# Patient Record
Sex: FEMALE | Race: WHITE | NOT HISPANIC OR LATINO
[De-identification: names, ages, dates, MRNs, and addresses within clinical notes are randomized per-mention and may not be internally consistent; named-entity substitution may affect disease eponyms.]

---

## 2019-04-04 ENCOUNTER — TRANSCRIPTION ENCOUNTER (OUTPATIENT)
Age: 54
End: 2019-04-04

## 2022-04-05 PROBLEM — Z00.00 ENCOUNTER FOR PREVENTIVE HEALTH EXAMINATION: Status: ACTIVE | Noted: 2022-04-05

## 2022-05-10 ENCOUNTER — APPOINTMENT (OUTPATIENT)
Dept: BARIATRICS/WEIGHT MGMT | Facility: CLINIC | Age: 57
End: 2022-05-10

## 2022-08-25 ENCOUNTER — APPOINTMENT (OUTPATIENT)
Dept: BARIATRICS/WEIGHT MGMT | Facility: CLINIC | Age: 57
End: 2022-08-25

## 2022-08-25 VITALS — HEIGHT: 64 IN | BODY MASS INDEX: 30.39 KG/M2 | WEIGHT: 178 LBS

## 2022-08-25 DIAGNOSIS — Z86.39 PERSONAL HISTORY OF OTHER ENDOCRINE, NUTRITIONAL AND METABOLIC DISEASE: ICD-10-CM

## 2022-08-25 DIAGNOSIS — Z83.438 FAMILY HISTORY OF OTHER DISORDER OF LIPOPROTEIN METABOLISM AND OTHER LIPIDEMIA: ICD-10-CM

## 2022-08-25 PROCEDURE — 99205 OFFICE O/P NEW HI 60 MIN: CPT

## 2022-08-25 NOTE — HISTORY OF PRESENT ILLNESS
[FreeTextEntry1] : 56 y/o F here for initial medical weight management consultation, self referred \par Medical Hx: Obesity, HLD \par Started struggling with weight after having a hysterectomy in 2010 for hx of fibroids and heavy bleeding. \par Past Wt Loss Attempts: WW, low carb, Atkins, Intermittent fasting, Medi weight loss \par Medications- tried phentermine in the past which was helpful with cravings & appetite\par Highest Adult Wt: now, Lowest Adult Wt:140, Goal:145 lb\par Food Recall: B- eggs, ham, eng muffin, L- salad or soup, D- steak, vegetables, baked potato\par Snacks- pretzels, nuts\par Water Intake: inadequate\par Exercise Regimen: Walks 3-4 miles x 6 days per week, in addition does strength training\par Sleep: adequate \par Stress Level: low stress\par Gyn: Postmenopausal\par Social Hx: + alcohol intake (4 nights per week, drinks beer)\par Labs: done at [facility] weight loss center 6 months ago, will send for my review \par \par \par

## 2022-08-25 NOTE — ASSESSMENT
[FreeTextEntry1] : Dietary Modification Education provided. Recommend a diet high in vegetables intake & lean protein. Recommend eating complex carbs instead of simple carbs. Discussed the healthy plate model and ways to help with portion control. Recommend Tracking dietary intake daily to increase your awareness of dietary intake- will start tracking with WW ria\par Physical Activity- continue current exercise routine\par Labs- to send results for my review \par Medication- pt meets criteria to initiate medication treatment of obesity. Instructed pt to reach out to insurance company to see if saxenda or wegovy are covered.\par Discussed the option to treat obesity with Saxenda. Patient denies contraindications to taking saxenda: denies family hx or personal history of medullary thyroid carcinoma or MEN 2, denies history of pancreatitis/gallbladder disease/hypoglycemia/renal impairment/ suicidal ideation.  Discussed mechanism of action- works like GLP-1 by regulating appetite/ reducing hunger. Reviewed side effects including: N/V/D/C, injection site reactions, headaches, low blood sugar, dizziness, abdominal pain, and fatigue.  Discussed injection education & titration schedule.\par RTO in 2-4 weeks for next steps \par \par

## 2022-09-14 ENCOUNTER — APPOINTMENT (OUTPATIENT)
Dept: BARIATRICS/WEIGHT MGMT | Facility: CLINIC | Age: 57
End: 2022-09-14

## 2022-09-14 VITALS — BODY MASS INDEX: 29.88 KG/M2 | WEIGHT: 175 LBS | HEIGHT: 64 IN

## 2022-09-14 LAB
ALBUMIN SERPL ELPH-MCNC: 4.7 G/DL
ALP BLD-CCNC: 74 U/L
ALT SERPL-CCNC: 32 U/L
ANION GAP SERPL CALC-SCNC: 11 MMOL/L
AST SERPL-CCNC: 23 U/L
BASOPHILS # BLD AUTO: 0.02 K/UL
BASOPHILS NFR BLD AUTO: 0.3 %
BILIRUB SERPL-MCNC: 0.3 MG/DL
BUN SERPL-MCNC: 12 MG/DL
CALCIUM SERPL-MCNC: 9.5 MG/DL
CHLORIDE SERPL-SCNC: 102 MMOL/L
CHOLEST SERPL-MCNC: 289 MG/DL
CO2 SERPL-SCNC: 27 MMOL/L
CREAT SERPL-MCNC: 0.81 MG/DL
CRP SERPL-MCNC: 12 MG/L
EGFR: 86 ML/MIN/1.73M2
EOSINOPHIL # BLD AUTO: 0.22 K/UL
EOSINOPHIL NFR BLD AUTO: 3.5 %
ESTIMATED AVERAGE GLUCOSE: 103 MG/DL
GLUCOSE SERPL-MCNC: 101 MG/DL
HBA1C MFR BLD HPLC: 5.2 %
HCT VFR BLD CALC: 45.6 %
HDLC SERPL-MCNC: 68 MG/DL
HGB BLD-MCNC: 14.4 G/DL
IMM GRANULOCYTES NFR BLD AUTO: 0.6 %
INSULIN P FAST SERPL-ACNC: 8 UU/ML
LDLC SERPL CALC-MCNC: 196 MG/DL
LYMPHOCYTES # BLD AUTO: 1.4 K/UL
LYMPHOCYTES NFR BLD AUTO: 22.2 %
MAN DIFF?: NORMAL
MCHC RBC-ENTMCNC: 29.6 PG
MCHC RBC-ENTMCNC: 31.6 GM/DL
MCV RBC AUTO: 93.8 FL
MONOCYTES # BLD AUTO: 0.53 K/UL
MONOCYTES NFR BLD AUTO: 8.4 %
NEUTROPHILS # BLD AUTO: 4.09 K/UL
NEUTROPHILS NFR BLD AUTO: 65 %
NONHDLC SERPL-MCNC: 221 MG/DL
PLATELET # BLD AUTO: 208 K/UL
POTASSIUM SERPL-SCNC: 4.8 MMOL/L
PROT SERPL-MCNC: 7.3 G/DL
RBC # BLD: 4.86 M/UL
RBC # FLD: 12.3 %
SODIUM SERPL-SCNC: 141 MMOL/L
T3 SERPL-MCNC: 105 NG/DL
T4 FREE SERPL-MCNC: 1 NG/DL
TRIGL SERPL-MCNC: 121 MG/DL
TSH SERPL-ACNC: 4.18 UIU/ML
WBC # FLD AUTO: 6.3 K/UL

## 2022-09-14 PROCEDURE — 99213 OFFICE O/P EST LOW 20 MIN: CPT | Mod: 95

## 2022-09-14 NOTE — ASSESSMENT
[FreeTextEntry1] : Recommend dietary modification. Reduce intake of bad fat including-fried foods, full fat dairy products, red meat, meat with skin on it, processed fats/ snacks. Recommend eating foods with healthy fat including avocado, fish, omega-3 fatty acids, nuts, lean protein. Recommend avoiding simple sugars and instead eating complex carbohydrates/ whole grains. Recommend increasing your physical activity. Avoid alcohol consumption.\par Continue current exercise routine of walking 4 + days per week, inc strength training when able\par Medication- can continue to take phentermine. In addition, will send rx for saxenda to help with weight loss which should help reduce cholesterol levels. \par Labs- recheck CMP, CRP and cholesterol in 3 months, if not improved should discuss starting a statin with PCP \par RTO in 1 month- 10/12 at 3:45 \par \par

## 2022-09-14 NOTE — HISTORY OF PRESENT ILLNESS
[Other Location: e.g. School (Enter Location, City,State)___] : at [unfilled], at the time of the visit. [Other Location: e.g. Home (Enter Location, City,State)___] : at [unfilled] [Verbal consent obtained from patient] : the patient, [unfilled] [FreeTextEntry1] : 58 y/o F here for initial medical weight management consultation, self referred \par Medical Hx: Obesity, HLD \par Started struggling with weight after having a hysterectomy in 2010 for hx of fibroids and heavy bleeding. \par Past Wt Loss Attempts: WW, low carb, Atkins, Intermittent fasting, Medi weight loss \par Medications- tried phentermine in the past which was helpful with cravings & appetite\par Highest Adult Wt: now, Lowest Adult Wt:140, Goal:145 lb\par Food Recall: B- eggs, ham, eng muffin, L- salad or soup, D- steak, vegetables, baked potato\par Snacks- pretzels, nuts\par Water Intake: inadequate\par Exercise Regimen: Walks 3-4 miles x 6 days per week, in addition does strength training\par Sleep: adequate \par Stress Level: low stress\par Gyn: Postmenopausal\par Social Hx: + alcohol intake (4 nights per week, drinks beer)\par Labs: done at [facility] weight loss center 6 months ago, will send for my review \par \par 9/14/22: Lost 3 lbs, has been focusing on increasing water intake and eating better overall. Walks 4-6 times per week. Reduced alcohol intake to 2 x per week. Takes phentermine 30 mg daily and feels that this helps with decreased cravings.

## 2022-09-28 ENCOUNTER — TRANSCRIPTION ENCOUNTER (OUTPATIENT)
Age: 57
End: 2022-09-28

## 2022-10-19 ENCOUNTER — APPOINTMENT (OUTPATIENT)
Dept: BARIATRICS/WEIGHT MGMT | Facility: CLINIC | Age: 57
End: 2022-10-19

## 2022-10-19 VITALS — BODY MASS INDEX: 29.19 KG/M2 | WEIGHT: 171 LBS | HEIGHT: 64 IN

## 2022-10-19 PROCEDURE — 99213 OFFICE O/P EST LOW 20 MIN: CPT | Mod: 95

## 2022-10-19 NOTE — HISTORY OF PRESENT ILLNESS
[FreeTextEntry1] : 56 y/o F here for initial medical weight management consultation, self referred \par Medical Hx: Obesity, HLD \par Started struggling with weight after having a hysterectomy in 2010 for hx of fibroids and heavy bleeding. \par Past Wt Loss Attempts: WW, low carb, Atkins, Intermittent fasting, Medi weight loss \par Medications- tried phentermine in the past which was helpful with cravings & appetite\par Highest Adult Wt: now, Lowest Adult Wt:140, Goal:145 lb\par Food Recall: B- eggs, ham, eng muffin, L- salad or soup, D- steak, vegetables, baked potato\par Snacks- pretzels, nuts\par Water Intake: inadequate\par Exercise Regimen: Walks 3-4 miles x 6 days per week, in addition does strength training\par Sleep: adequate \par Stress Level: low stress\par Gyn: Postmenopausal\par Social Hx: + alcohol intake (4 nights per week, drinks beer), works as a teacher \par Labs: done at [facility] weight loss center 6 months ago, will send for my review \par \par 9/14/22: Lost 3 lbs, has been focusing on increasing water intake and eating better overall. Walks 4-6 times per week. Reduced alcohol intake to 2 x per week. Takes phentermine 30 mg daily and feels that this helps with decreased cravings. \par \par 10/19/22: Lost 4 lbs, down 7 lbs total. Focusing on eating healthy, Food recall: B- overnight oats or eggs with turkey gee, L- salad or soup, D- protein with vegetables. Tolerating phentermine 30 mg daily, continues to report good appetite suppression & dec cravings. + Side effects of difficulty falling asleep, may try sleepy time tea. Tried taking omega 3 supplement but had bad gas so discontinued.

## 2022-10-19 NOTE — ASSESSMENT
[FreeTextEntry1] : Continue healthier dietary intake daily, try to focus on increasing daily water intake\par Continue current exercise routine of walking 10,000 steps per day throughout the week. Recommend she exercise on the weekend since she is busy during the school week. \par Medication- can continue to take phentermine. Sent Rx for saxenda, awaiting insurance authorization\par Issues with falling asleep- rec she try sleepy time tea and could try 1 mg of melatonin at night to see if it helps with falling asleep \par Labs- recheck CMP, CRP and cholesterol in 2 months, if not improved should discuss starting a statin\par RTO in 1 month

## 2022-11-14 ENCOUNTER — APPOINTMENT (OUTPATIENT)
Dept: BARIATRICS/WEIGHT MGMT | Facility: CLINIC | Age: 57
End: 2022-11-14

## 2022-11-14 PROCEDURE — 99212 OFFICE O/P EST SF 10 MIN: CPT | Mod: 95

## 2022-11-14 NOTE — HISTORY OF PRESENT ILLNESS
[Other Location: e.g. School (Enter Location, City,State)___] : at [unfilled], at the time of the visit. [Other Location: e.g. Home (Enter Location, City,State)___] : at [unfilled] [Verbal consent obtained from patient] : the patient, [unfilled] [FreeTextEntry1] : 56 y/o F here for initial medical weight management consultation, self referred \par Medical Hx: Obesity, HLD \par Started struggling with weight after having a hysterectomy in 2010 for hx of fibroids and heavy bleeding. \par Past Wt Loss Attempts: WW, low carb, Atkins, Intermittent fasting, Medi weight loss \par Medications- tried phentermine in the past which was helpful with cravings & appetite\par Highest Adult Wt: now, Lowest Adult Wt:140, Goal:145 lb\par Food Recall: B- eggs, ham, eng muffin, L- salad or soup, D- steak, vegetables, baked potato\par Snacks- pretzels, nuts\par Water Intake: inadequate\par Exercise Regimen: Walks 3-4 miles x 6 days per week, in addition does strength training\par Sleep: adequate \par Stress Level: low stress\par Gyn: Postmenopausal\par Social Hx: + alcohol intake (4 nights per week, drinks beer)\par Labs: done at [facility] weight loss center 6 months ago, will send for my review \par \par 9/14/22: Lost 3 lbs, has been focusing on increasing water intake and eating better overall. Walks 4-6 times per week. Reduced alcohol intake to 2 x per week. Takes phentermine 30 mg daily and feels that this helps with decreased cravings. \par \par 11/14/22: Maintained weight. Taking fish oil, which she is tolerating better. Water intake better. Walking outdoors for exercise. Has been sleeping better, taking phentermine earlier in the morning and drinks sleepy time tea when she is having trouble falling asleep. Wants to continue on phentermine until we hear back from insurance about saxenda. Has an apt with PCP in December.

## 2022-11-14 NOTE — ASSESSMENT
[FreeTextEntry1] : Continue dietary modification focused on lean protein, vegetables, and whole grains. \par Continue to walk for exercise - increase as able\par Medication- rx renewal for Phentermine sent. Trying to get ins auth for saxenda\par Labs- to be done in 1 month(either at PCP apt or at [facility])\par RTO in 1 month

## 2022-11-16 ENCOUNTER — TRANSCRIPTION ENCOUNTER (OUTPATIENT)
Age: 57
End: 2022-11-16

## 2023-01-05 ENCOUNTER — APPOINTMENT (OUTPATIENT)
Dept: BARIATRICS/WEIGHT MGMT | Facility: CLINIC | Age: 58
End: 2023-01-05
Payer: COMMERCIAL

## 2023-01-05 VITALS — WEIGHT: 170 LBS | BODY MASS INDEX: 29.02 KG/M2 | HEIGHT: 64 IN

## 2023-01-05 PROCEDURE — 99213 OFFICE O/P EST LOW 20 MIN: CPT | Mod: 95

## 2023-01-05 NOTE — ASSESSMENT
[FreeTextEntry1] : Continue healthier dietary intake daily, try to focus on increasing daily water intake, eating lean protein, vegetables, and whole grains. \par Continue walking and start strength training 3 x per week \par Medication- can continue to take phentermine. Sent Rx for saxenda, given information to call insurance to see if other meds may be covered instead\par RTO in 1 month- scheduled a f/u on 2/8 at 11:30

## 2023-01-05 NOTE — HISTORY OF PRESENT ILLNESS
[Other Location: e.g. Home (Enter Location, City,State)___] : at [unfilled] [Verbal consent obtained from patient] : the patient, [unfilled] [Other Location: e.g. School (Enter Location, City,State)___] : at [unfilled], at the time of the visit. [FreeTextEntry1] : 58 y/o F here for initial medical weight management consultation, self referred \par Medical Hx: Obesity, HLD \par Started struggling with weight after having a hysterectomy in 2010 for hx of fibroids and heavy bleeding. \par Past Wt Loss Attempts: WW, low carb, Atkins, Intermittent fasting, Medi weight loss \par Medications- tried phentermine in the past which was helpful with cravings & appetite\par Highest Adult Wt: now, Lowest Adult Wt:140, Goal:145 lb\par Food Recall: B- eggs, ham, eng muffin, L- salad or soup, D- steak, vegetables, baked potato\par Snacks- pretzels, nuts\par Water Intake: inadequate\par Exercise Regimen: Walks 3-4 miles x 6 days per week, in addition does strength training\par Sleep: adequate \par Stress Level: low stress\par Gyn: Postmenopausal\par Social Hx: + alcohol intake (4 nights per week, drinks beer)\par Labs: done at [facility] weight loss center 6 months ago, will send for my review \par \par 9/14/22: Lost 3 lbs, has been focusing on increasing water intake and eating better overall. Walks 4-6 times per week. Reduced alcohol intake to 2 x per week. Takes phentermine 30 mg daily and feels that this helps with decreased cravings. \par \par 11/14/22: Maintained weight. Taking fish oil, which she is tolerating better. Water intake better. Walking outdoors for exercise. Has been sleeping better, taking phentermine earlier in the morning and drinks sleepy time tea when she is having trouble falling asleep. Wants to continue on phentermine until we hear back from insurance about saxenda. Has an apt with PCP in December. \par \par 1/5/22: Lost 1 lb, down 8 lbs total. Feels that phentermine continues to help with cravings and appetite. Saw PCP in Decemeber and had elevated cholesterol level, was started on a low dose statin and plans to have labs rechecked in Feb. Walking for exercise, but plans to start strength training 3 days per week soon. Would like to start on a GLP1 if insurance will approve.

## 2023-02-08 ENCOUNTER — APPOINTMENT (OUTPATIENT)
Dept: BARIATRICS/WEIGHT MGMT | Facility: CLINIC | Age: 58
End: 2023-02-08
Payer: COMMERCIAL

## 2023-02-08 VITALS — HEIGHT: 64 IN | WEIGHT: 168 LBS | BODY MASS INDEX: 28.68 KG/M2

## 2023-02-08 PROCEDURE — 99213 OFFICE O/P EST LOW 20 MIN: CPT | Mod: 95

## 2023-02-08 NOTE — ASSESSMENT
[FreeTextEntry1] : Continue healthier dietary intake daily\par Goals-focus on increasing daily water intake, increase vegetable intake, track dietary intake to stay accountability and inc exercise regimen as weather gets nicer\par Continue current exercise routine of walking 10,000 steps per day throughout the week. \par Medication- can continue to take phentermine. May add topiramate low dose if feels that phentermine alone is ineffective. Has list of medical weight loss meds to call insurance about. Checked wegovy website and she does not have coverage for wegovy\par RTO in 1 month

## 2023-02-08 NOTE — END OF VISIT
Patient attended Phase 2 Cardiac Rehab Exercise Session. Further documentation will be scanned into the medical record upon discharge.  
[Time Spent: ___ minutes] : I have spent [unfilled] minutes of time on the encounter.

## 2023-02-08 NOTE — HISTORY OF PRESENT ILLNESS
[Other Location: e.g. School (Enter Location, City,State)___] : at [unfilled], at the time of the visit. [Other Location: e.g. Home (Enter Location, City,State)___] : at [unfilled] [Verbal consent obtained from patient] : the patient, [unfilled] [FreeTextEntry1] : 56 y/o F here for initial medical weight management consultation, self referred \par Medical Hx: Obesity, HLD \par Started struggling with weight after having a hysterectomy in 2010 for hx of fibroids and heavy bleeding. \par Past Wt Loss Attempts: WW, low carb, Atkins, Intermittent fasting, Medi weight loss \par Medications- tried phentermine in the past which was helpful with cravings & appetite\par Highest Adult Wt: now, Lowest Adult Wt:140, Goal:145 lb\par Food Recall: B- eggs, ham, eng muffin, L- salad or soup, D- steak, vegetables, baked potato\par Snacks- pretzels, nuts\par Water Intake: inadequate\par Exercise Regimen: Walks 3-4 miles x 6 days per week, in addition does strength training\par Sleep: adequate \par Stress Level: low stress\par Gyn: Postmenopausal\par Social Hx: + alcohol intake (4 nights per week, drinks beer)\par Labs: done at [facility] weight loss center 6 months ago, will send for my review \par \par 9/14/22: Lost 3 lbs, has been focusing on increasing water intake and eating better overall. Walks 4-6 times per week. Reduced alcohol intake to 2 x per week. Takes phentermine 30 mg daily and feels that this helps with decreased cravings. \par \par 11/14/22: Maintained weight. Taking fish oil, which she is tolerating better. Water intake better. Walking outdoors for exercise. Has been sleeping better, taking phentermine earlier in the morning and drinks sleepy time tea when she is having trouble falling asleep. Wants to continue on phentermine until we hear back from insurance about saxenda. Has an apt with PCP in December. \par \par 1/5/22: Lost 1 lb, down 8 lbs total. Feels that phentermine continues to help with cravings and appetite. Saw PCP in December and had elevated cholesterol level, was started on a low dose statin and plans to have labs rechecked in Feb. Walking for exercise, but plans to start strength training 3 days per week soon. Would like to start on a GLP1 if insurance will approve. \par \par 2/8/23: Lost 2 lbs, down 10 lbs total. Started taking a statin for cholesterol. Walking more overall. Tracking dietary intake which is helping. Water intake- inadequate. Trying to incorporate healthy food options and decrease carb intake: enjoys on an egg over sweet potato for breakfast and increasing vegetable intake. Doing better with falling asleep

## 2023-02-15 ENCOUNTER — APPOINTMENT (OUTPATIENT)
Dept: GASTROENTEROLOGY | Facility: CLINIC | Age: 58
End: 2023-02-15
Payer: COMMERCIAL

## 2023-02-15 VITALS
WEIGHT: 169 LBS | RESPIRATION RATE: 16 BRPM | HEART RATE: 84 BPM | DIASTOLIC BLOOD PRESSURE: 91 MMHG | HEIGHT: 64 IN | BODY MASS INDEX: 28.85 KG/M2 | SYSTOLIC BLOOD PRESSURE: 127 MMHG | OXYGEN SATURATION: 97 %

## 2023-02-15 DIAGNOSIS — Z12.11 ENCOUNTER FOR SCREENING FOR MALIGNANT NEOPLASM OF COLON: ICD-10-CM

## 2023-02-15 PROCEDURE — 99203 OFFICE O/P NEW LOW 30 MIN: CPT

## 2023-02-15 NOTE — HISTORY OF PRESENT ILLNESS
[FreeTextEntry1] : 58 yo F here for initial screening colonoscopy. \par Denies GI symptoms, denies GI family hx . \par \par \par Last EGD -none prior\par Last colonoscopy:;none prior\par \par Soc: no tobacco or significant EtOH\par \par Family Hx: no significant GI family history including colon cancer, stomach cancer, IBD, celiac\par \par ROS:\par constitutional: no weight loss, fevers\par ENT: no deafness\par Eyes: no blindness\par Neck: no lymphadenopathy\par Chest: no shortness of breath, no cough\par Cardiac: no chest pain, no palpitations\par Vascular: no leg swelling\par GI: no abdominal pain, nausea, vomiting, diarrhea, constipation, rectal bleeding, melena, dysphagia,  unless otherwise noted in HPI\par : no dysuria, dark urine\par Skin: no rashes, lesions, jaundice\par Heme: no bleeding\par Endocrine: no DM  unless otherwise stated in HPI\par \par Physical Exam: (VS noted below)\par General: alert, comfortable, in no acute distress\par Eyes: normal sclera, anicteric\par Neck: normal, supple, no neck mass\par Pulm: no respiratory distress, clear to auscultation bilaterally \par Heart: RRR, normal S1 S2\par Abd: Soft, non-tender, non-distended, normal bowel sounds, no appreciable hepatosplenomegaly, no masses palpated\par Rectal: deferred\par Ext: warm and well perfused, no edema\par Skin: no rashes, no juandice\par Neuro: alert, grossly nonfocal\par \par Labs/imaging/prior endoscopic results were reviewed to the extent available and pertinent findings noted in HPI\par

## 2023-02-15 NOTE — CONSULT LETTER
[Dear  ___] : Dear  [unfilled], [Consult Letter:] : I had the pleasure of evaluating your patient, [unfilled]. [Please see my note below.] : Please see my note below. [Consult Closing:] : Thank you very much for allowing me to participate in the care of this patient.  If you have any questions, please do not hesitate to contact me. [Sincerely,] : Sincerely, [FreeTextEntry3] : Avis Chambers M.D.\par

## 2023-02-15 NOTE — ASSESSMENT
[FreeTextEntry1] : 56 yo F here for colonoscopy\par \par Will plan on a colonoscopy for screening. \par Explained the risks (including but not limited to cardiopulmonary / anesthesia complications, bleeding, infection, perforation, aspiration, missed lesions, internal organ injury), benefits, and alternatives. Preparation for the procedure was reviewed with the patient. The patient was informed that she/he would be given IV anesthesia by an anesthesiologist during the procedure. The patient was informed that a family member or friend must drive the patient following recovery from the procedure. The patient understands and agrees, all questions answered. Will use a miralax/dulcolax bowel prep with clears the day prior. \par \par \par \par

## 2023-03-20 ENCOUNTER — APPOINTMENT (OUTPATIENT)
Dept: BARIATRICS/WEIGHT MGMT | Facility: CLINIC | Age: 58
End: 2023-03-20
Payer: COMMERCIAL

## 2023-03-20 VITALS — BODY MASS INDEX: 28.85 KG/M2 | WEIGHT: 169 LBS | HEIGHT: 64 IN

## 2023-03-20 PROCEDURE — 99213 OFFICE O/P EST LOW 20 MIN: CPT | Mod: 95

## 2023-03-20 NOTE — HISTORY OF PRESENT ILLNESS
[Other Location: e.g. School (Enter Location, City,State)___] : at [unfilled], at the time of the visit. [Other Location: e.g. Home (Enter Location, City,State)___] : at [unfilled] [Verbal consent obtained from patient] : the patient, [unfilled] [FreeTextEntry1] : 56 y/o F here for initial medical weight management consultation, self referred \par Medical Hx: Obesity, HLD \par Started struggling with weight after having a hysterectomy in 2010 for hx of fibroids and heavy bleeding. \par Past Wt Loss Attempts: WW, low carb, Atkins, Intermittent fasting, Medi weight loss \par Medications- tried phentermine in the past which was helpful with cravings & appetite\par Highest Adult Wt: now, Lowest Adult Wt:140, Goal:145 lb\par Food Recall: B- eggs, ham, eng muffin, L- salad or soup, D- steak, vegetables, baked potato\par Snacks- pretzels, nuts\par Water Intake: inadequate\par Exercise Regimen: Walks 3-4 miles x 6 days per week, in addition does strength training\par Sleep: adequate \par Stress Level: low stress\par Gyn: Postmenopausal\par Social Hx: + alcohol intake (4 nights per week, drinks beer)\par Labs: done at [facility] weight loss center 6 months ago, will send for my review \par \par 9/14/22: Lost 3 lbs, has been focusing on increasing water intake and eating better overall. Walks 4-6 times per week. Reduced alcohol intake to 2 x per week. Takes phentermine 30 mg daily and feels that this helps with decreased cravings. \par \par 11/14/22: Maintained weight. Taking fish oil, which she is tolerating better. Water intake better. Walking outdoors for exercise. Has been sleeping better, taking phentermine earlier in the morning and drinks sleepy time tea when she is having trouble falling asleep. Wants to continue on phentermine until we hear back from insurance about saxenda. Has an apt with PCP in December. \par \par 1/5/22: Lost 1 lb, down 8 lbs total. Feels that phentermine continues to help with cravings and appetite. Saw PCP in December and had elevated cholesterol level, was started on a low dose statin and plans to have labs rechecked in Feb. Walking for exercise, but plans to start strength training 3 days per week soon. Would like to start on a GLP1 if insurance will approve. \par \par 2/8/23: Lost 2 lbs, down 10 lbs total. Started taking a statin for cholesterol. Walking more overall. Tracking dietary intake which is helping. Water intake- inadequate. Trying to incorporate healthy food options and decrease carb intake: enjoys on an egg over sweet potato for breakfast and increasing vegetable intake. Doing better with falling asleep\par \par 3/20/23:Maintained weight. Reports that she had Cholesterol rechecked and saw a significant reduction in cholesterol levels. Working on water intake. 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 Continues to tolerate phentermine 30 mg daily.

## 2023-03-20 NOTE — ASSESSMENT
[FreeTextEntry1] : Continue healthier dietary intake daily, try to focus on increasing daily water intake\par Continue current exercise routine of walking 10,000 steps per day throughout the week. Plans on walking 3 miles after school to inc exercise routine\par Medication- can continue to take phentermine. Will add topiramate 25 mg at bedtime to make qsymia and see if this helps with cravings and additional weight loss\par Discussed the option to treat obesity with topiramate. Discussed common side effects inc: n/v/c/d, issues with word finding, transient paraesthesias, fatigue.\par RTO in 1 month \par

## 2023-03-21 RX ORDER — SODIUM SULFATE, POTASSIUM SULFATE AND MAGNESIUM SULFATE 1.6; 3.13; 17.5 G/177ML; G/177ML; G/177ML
17.5-3.13-1.6 SOLUTION ORAL
Qty: 1 | Refills: 0 | Status: ACTIVE | COMMUNITY
Start: 2023-03-21 | End: 1900-01-01

## 2023-03-23 ENCOUNTER — RX CHANGE (OUTPATIENT)
Age: 58
End: 2023-03-23

## 2023-04-18 ENCOUNTER — APPOINTMENT (OUTPATIENT)
Dept: BARIATRICS/WEIGHT MGMT | Facility: CLINIC | Age: 58
End: 2023-04-18
Payer: COMMERCIAL

## 2023-04-18 VITALS — BODY MASS INDEX: 28.49 KG/M2 | WEIGHT: 166 LBS

## 2023-04-18 DIAGNOSIS — E66.9 OBESITY, UNSPECIFIED: ICD-10-CM

## 2023-04-18 PROCEDURE — 99212 OFFICE O/P EST SF 10 MIN: CPT | Mod: 95

## 2023-04-18 RX ORDER — LIRAGLUTIDE 6 MG/ML
18 INJECTION, SOLUTION SUBCUTANEOUS
Qty: 1 | Refills: 1 | Status: COMPLETED | COMMUNITY
Start: 2022-09-14 | End: 2023-04-18

## 2023-04-18 RX ORDER — PHENTERMINE HYDROCHLORIDE 30 MG/1
30 CAPSULE ORAL
Qty: 30 | Refills: 1 | Status: COMPLETED | COMMUNITY
Start: 2022-09-14 | End: 2023-04-18

## 2023-04-18 NOTE — HISTORY OF PRESENT ILLNESS
[Other Location: e.g. School (Enter Location, City,State)___] : at [unfilled], at the time of the visit. [Other Location: e.g. Home (Enter Location, City,State)___] : at [unfilled] [Verbal consent obtained from patient] : the patient, [unfilled] [FreeTextEntry1] : 56 y/o F here for initial medical weight management consultation, self referred \par Medical Hx: Obesity, HLD \par Started struggling with weight after having a hysterectomy in 2010 for hx of fibroids and heavy bleeding. \par Past Wt Loss Attempts: WW, low carb, Atkins, Intermittent fasting, Medi weight loss \par Medications- tried phentermine in the past which was helpful with cravings & appetite\par Highest Adult Wt: now, Lowest Adult Wt:140, Goal:145 lb\par Food Recall: B- eggs, ham, eng muffin, L- salad or soup, D- steak, vegetables, baked potato\par Snacks- pretzels, nuts\par Water Intake: inadequate\par Exercise Regimen: Walks 3-4 miles x 6 days per week, in addition does strength training\par Sleep: adequate \par Stress Level: low stress\par Gyn: Postmenopausal\par Social Hx: + alcohol intake (4 nights per week, drinks beer)\par Labs: done at [facility] weight loss center 6 months ago, will send for my review \par \par 9/14/22: Lost 3 lbs, has been focusing on increasing water intake and eating better overall. Walks 4-6 times per week. Reduced alcohol intake to 2 x per week. Takes phentermine 30 mg daily and feels that this helps with decreased cravings. \par \par 11/14/22: Maintained weight. Taking fish oil, which she is tolerating better. Water intake better. Walking outdoors for exercise. Has been sleeping better, taking phentermine earlier in the morning and drinks sleepy time tea when she is having trouble falling asleep. Wants to continue on phentermine until we hear back from insurance about saxenda. Has an apt with PCP in December. \par \par 1/5/22: Lost 1 lb, down 8 lbs total. Feels that phentermine continues to help with cravings and appetite. Saw PCP in December and had elevated cholesterol level, was started on a low dose statin and plans to have labs rechecked in Feb. Walking for exercise, but plans to start strength training 3 days per week soon. Would like to start on a GLP1 if insurance will approve. \par \par 2/8/23: Lost 2 lbs, down 10 lbs total. Started taking a statin for cholesterol. Walking more overall. Tracking dietary intake which is helping. Water intake- inadequate. Trying to incorporate healthy food options and decrease carb intake: enjoys on an egg over sweet potato for breakfast and increasing vegetable intake. Doing better with falling asleep\par \par 3/20/23:Maintained weight. Reports that she had Cholesterol rechecked and saw a significant reduction in cholesterol levels. Working on water intake. 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 Continues to tolerate phentermine 30 mg daily. \par \par 4/18/23: Tolerating phentermine and topiramate daily.  Sleeping better and feels more dry mouth. Has been walking- struggling with hip pain, but able to do 2-2.5 miles most days. Increasing water intake daily. Wants to focus on decreasing intake of diet coke daily.

## 2023-04-18 NOTE — ASSESSMENT
[FreeTextEntry1] : Continue healthier dietary intake daily, try to focus on increasing daily water intake & decreasing diet soda intake\par Continue current exercise routine of walking 10,000 steps per day throughout the week. Recommend incorporating strength training 2 x per week. Has videos that she can use. \par Medication- continue phentermine 30 mg & topiramate 25 mg daily for medication treatment of obesity \par Labs- recheck CMP, CRP and cholesterol at next apt \par RTO in 1 month

## 2023-05-15 ENCOUNTER — APPOINTMENT (OUTPATIENT)
Dept: BARIATRICS/WEIGHT MGMT | Facility: CLINIC | Age: 58
End: 2023-05-15
Payer: COMMERCIAL

## 2023-05-15 VITALS — BODY MASS INDEX: 28.32 KG/M2 | WEIGHT: 165 LBS

## 2023-05-15 PROCEDURE — 99212 OFFICE O/P EST SF 10 MIN: CPT | Mod: 95

## 2023-05-15 NOTE — HISTORY OF PRESENT ILLNESS
[Other Location: e.g. School (Enter Location, City,State)___] : at [unfilled], at the time of the visit. [Other Location: e.g. Home (Enter Location, City,State)___] : at [unfilled] [Verbal consent obtained from patient] : the patient, [unfilled] [FreeTextEntry1] : 56 y/o F here for initial medical weight management consultation, self referred \par Medical Hx: Obesity, HLD \par Started struggling with weight after having a hysterectomy in 2010 for hx of fibroids and heavy bleeding. \par Past Wt Loss Attempts: WW, low carb, Atkins, Intermittent fasting, Medi weight loss \par Medications- tried phentermine in the past which was helpful with cravings & appetite\par Highest Adult Wt: now, Lowest Adult Wt:140, Goal:145 lb\par Food Recall: B- eggs, ham, eng muffin, L- salad or soup, D- steak, vegetables, baked potato\par Snacks- pretzels, nuts\par Water Intake: inadequate\par Exercise Regimen: Walks 3-4 miles x 6 days per week, in addition does strength training\par Sleep: adequate \par Stress Level: low stress\par Gyn: Postmenopausal\par Social Hx: + alcohol intake (4 nights per week, drinks beer)\par Labs: done at [facility] weight loss center 6 months ago, will send for my review \par \par 9/14/22: Lost 3 lbs, has been focusing on increasing water intake and eating better overall. Walks 4-6 times per week. Reduced alcohol intake to 2 x per week. Takes phentermine 30 mg daily and feels that this helps with decreased cravings. \par \par 11/14/22: Maintained weight. Taking fish oil, which she is tolerating better. Water intake better. Walking outdoors for exercise. Has been sleeping better, taking phentermine earlier in the morning and drinks sleepy time tea when she is having trouble falling asleep. Wants to continue on phentermine until we hear back from insurance about saxenda. Has an apt with PCP in December. \par \par 1/5/22: Lost 1 lb, down 8 lbs total. Feels that phentermine continues to help with cravings and appetite. Saw PCP in December and had elevated cholesterol level, was started on a low dose statin and plans to have labs rechecked in Feb. Walking for exercise, but plans to start strength training 3 days per week soon. Would like to start on a GLP1 if insurance will approve. \par \par 2/8/23: Lost 2 lbs, down 10 lbs total. Started taking a statin for cholesterol. Walking more overall. Tracking dietary intake which is helping. Water intake- inadequate. Trying to incorporate healthy food options and decrease carb intake: enjoys on an egg over sweet potato for breakfast and increasing vegetable intake. Doing better with falling asleep\par \par 3/20/23:Maintained weight. Reports that she had Cholesterol rechecked and saw a significant reduction in cholesterol levels. Working on water intake. 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 Continues to tolerate phentermine 30 mg daily. \par \par 4/18/23: Tolerating phentermine and topiramate daily.  Sleeping better and feels more dry mouth. Has been walking- struggling with hip pain, but able to do 2-2.5 miles most days. Increasing water intake daily. Wants to focus on decreasing intake of diet coke daily. \par \par 5/15/23: Walking, increased water intake. Down an inch in waist. Fitting in jeans better.

## 2023-05-15 NOTE — ASSESSMENT
[FreeTextEntry1] : Continue healthier dietary intake daily, try to focus on increasing daily water intake & cutting out diet coke\par Continue current exercise routine of walking 10,000 steps per day & start incorporating strength training\par Medication- can continue to take phentermine & topiramate regimen\par Labs- recheck CMP, CRP and cholesterol in 2 months, if not improved should discuss starting a statin\par RTO in 1 month

## 2023-05-26 RX ORDER — SODIUM SULFATE, POTASSIUM SULFATE AND MAGNESIUM SULFATE 1.6; 3.13; 17.5 G/177ML; G/177ML; G/177ML
17.5-3.13-1.6 SOLUTION ORAL
Qty: 1 | Refills: 0 | Status: ACTIVE | COMMUNITY
Start: 2023-05-26 | End: 1900-01-01

## 2023-06-01 ENCOUNTER — RESULT REVIEW (OUTPATIENT)
Age: 58
End: 2023-06-01

## 2023-06-02 ENCOUNTER — APPOINTMENT (OUTPATIENT)
Dept: GASTROENTEROLOGY | Facility: HOSPITAL | Age: 58
End: 2023-06-02
Payer: COMMERCIAL

## 2023-06-02 PROCEDURE — 45380 COLONOSCOPY AND BIOPSY: CPT

## 2023-06-21 ENCOUNTER — APPOINTMENT (OUTPATIENT)
Dept: BARIATRICS/WEIGHT MGMT | Facility: CLINIC | Age: 58
End: 2023-06-21
Payer: COMMERCIAL

## 2023-06-21 VITALS — WEIGHT: 165 LBS | BODY MASS INDEX: 28.32 KG/M2

## 2023-06-21 PROCEDURE — 99212 OFFICE O/P EST SF 10 MIN: CPT | Mod: 95

## 2023-06-21 RX ORDER — PEN NEEDLE, DIABETIC 29 G X1/2"
32G X 4 MM NEEDLE, DISPOSABLE MISCELLANEOUS
Qty: 1 | Refills: 2 | Status: DISCONTINUED | COMMUNITY
Start: 2022-09-14 | End: 2023-06-21

## 2023-06-21 NOTE — ASSESSMENT
[FreeTextEntry1] : Continue healthy dietary intake \par Exercise goal- plans on waling most days in the morning, in addition plans on going to [employer] work outs in [place] \par Med- continue medication treatment with phentermine and topiramate as she is tolerating this well and able to avoid snacking\par Labs- not needed at this time. Had them done at PCPs office- see Next [facility] for results \par RTO in 2 months

## 2023-06-21 NOTE — HISTORY OF PRESENT ILLNESS
[Other Location: e.g. School (Enter Location, City,State)___] : at [unfilled], at the time of the visit. [Other Location: e.g. Home (Enter Location, City,State)___] : at [unfilled] [Verbal consent obtained from patient] : the patient, [unfilled] [FreeTextEntry1] : 58 y/o F here for initial medical weight management consultation, self referred \par Medical Hx: Obesity, HLD \par Started struggling with weight after having a hysterectomy in 2010 for hx of fibroids and heavy bleeding. \par Past Wt Loss Attempts: WW, low carb, Atkins, Intermittent fasting, Medi weight loss \par Medications- tried phentermine in the past which was helpful with cravings & appetite\par Highest Adult Wt: now, Lowest Adult Wt:140, Goal:145 lb\par Food Recall: B- eggs, ham, eng muffin, L- salad or soup, D- steak, vegetables, baked potato\par Snacks- pretzels, nuts\par Water Intake: inadequate\par Exercise Regimen: Walks 3-4 miles x 6 days per week, in addition does strength training\par Sleep: adequate \par Stress Level: low stress\par Gyn: Postmenopausal\par Social Hx: + alcohol intake (4 nights per week, drinks beer)\par Labs: done at [facility] weight loss center 6 months ago, will send for my review \par \par 9/14/22: Lost 3 lbs, has been focusing on increasing water intake and eating better overall. Walks 4-6 times per week. Reduced alcohol intake to 2 x per week. Takes phentermine 30 mg daily and feels that this helps with decreased cravings. \par \par 11/14/22: Maintained weight. Taking fish oil, which she is tolerating better. Water intake better. Walking outdoors for exercise. Has been sleeping better, taking phentermine earlier in the morning and drinks sleepy time tea when she is having trouble falling asleep. Wants to continue on phentermine until we hear back from insurance about saxenda. Has an apt with PCP in December. \par \par 1/5/22: Lost 1 lb, down 8 lbs total. Feels that phentermine continues to help with cravings and appetite. Saw PCP in December and had elevated cholesterol level, was started on a low dose statin and plans to have labs rechecked in Feb. Walking for exercise, but plans to start strength training 3 days per week soon. Would like to start on a GLP1 if insurance will approve. \par \par 2/8/23: Lost 2 lbs, down 10 lbs total. Started taking a statin for cholesterol. Walking more overall. Tracking dietary intake which is helping. Water intake- inadequate. Trying to incorporate healthy food options and decrease carb intake: enjoys on an egg over sweet potato for breakfast and increasing vegetable intake. Doing better with falling asleep\par \par 3/20/23:Maintained weight. Reports that she had Cholesterol rechecked and saw a significant reduction in cholesterol levels. Working on water intake. 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 Continues to tolerate phentermine 30 mg daily. \par \par 4/18/23: Tolerating phentermine and topiramate daily.  Sleeping better and feels more dry mouth. Has been walking- struggling with hip pain, but able to do 2-2.5 miles most days. Increasing water intake daily. Wants to focus on decreasing intake of diet coke daily. \par \par 5/15/23: Walking, increased water intake. Down an inch in waist. Fitting in jeans better.\par \par 6/21/23: Maintained weight, continues to walk for exercise and drink adequate water. Has been eating well overall. Plans on focusing on exercise once the summer begins since she will be off. [place] and then hanging at the beach. Already plans on meal prepping for beach days to avoid going to the concession stand.

## 2023-08-30 ENCOUNTER — APPOINTMENT (OUTPATIENT)
Dept: BARIATRICS/WEIGHT MGMT | Facility: CLINIC | Age: 58
End: 2023-08-30

## 2023-09-27 ENCOUNTER — APPOINTMENT (OUTPATIENT)
Dept: BARIATRICS/WEIGHT MGMT | Facility: CLINIC | Age: 58
End: 2023-09-27
Payer: COMMERCIAL

## 2023-09-27 VITALS — WEIGHT: 161 LBS | BODY MASS INDEX: 27.49 KG/M2 | HEIGHT: 64 IN

## 2023-09-27 PROCEDURE — 99213 OFFICE O/P EST LOW 20 MIN: CPT | Mod: 95

## 2023-10-19 ENCOUNTER — APPOINTMENT (OUTPATIENT)
Dept: DERMATOLOGY | Facility: CLINIC | Age: 58
End: 2023-10-19

## 2023-11-06 ENCOUNTER — TRANSCRIPTION ENCOUNTER (OUTPATIENT)
Age: 58
End: 2023-11-06

## 2023-11-20 ENCOUNTER — APPOINTMENT (OUTPATIENT)
Dept: BARIATRICS/WEIGHT MGMT | Facility: CLINIC | Age: 58
End: 2023-11-20
Payer: COMMERCIAL

## 2023-11-20 VITALS — WEIGHT: 162 LBS | HEIGHT: 64 IN | BODY MASS INDEX: 27.66 KG/M2

## 2023-11-20 PROCEDURE — 99212 OFFICE O/P EST SF 10 MIN: CPT | Mod: 95

## 2023-11-20 RX ORDER — PHENTERMINE HYDROCHLORIDE 30 MG/1
30 CAPSULE ORAL
Qty: 30 | Refills: 1 | Status: COMPLETED | COMMUNITY
Start: 2023-03-20 | End: 2023-11-20

## 2024-01-11 ENCOUNTER — RX RENEWAL (OUTPATIENT)
Age: 59
End: 2024-01-11

## 2024-02-14 ENCOUNTER — APPOINTMENT (OUTPATIENT)
Dept: BARIATRICS/WEIGHT MGMT | Facility: CLINIC | Age: 59
End: 2024-02-14
Payer: COMMERCIAL

## 2024-02-14 VITALS — WEIGHT: 163 LBS | BODY MASS INDEX: 27.83 KG/M2 | HEIGHT: 64 IN

## 2024-02-14 PROCEDURE — 99212 OFFICE O/P EST SF 10 MIN: CPT

## 2024-02-14 NOTE — ASSESSMENT
[FreeTextEntry1] : Cntinue to focus on healthy lifestyle- increase protein and fiber intake  Inc exercise regimen- celebrated the fact that she started to incorporate strength training  Med- can continue current medication treatment, inc topiramate to 75 mg QHS RTO in 2 months, scheduled a f/u apt on 4/15 at 11:45

## 2024-02-14 NOTE — HISTORY OF PRESENT ILLNESS
[Other Location: e.g. School (Enter Location, City,State)___] : at [unfilled], at the time of the visit. [Other Location: e.g. Home (Enter Location, City,State)___] : at [unfilled] [Verbal consent obtained from patient] : the patient, [unfilled] [FreeTextEntry1] : 58 y/o F here for initial medical weight management consultation, self referred  Medical Hx: Obesity, HLD  Started struggling with weight after having a hysterectomy in 2010 for hx of fibroids and heavy bleeding.  Past Wt Loss Attempts: WW, low carb, Atkins, Intermittent fasting, Medi weight loss  Medications- tried phentermine in the past which was helpful with cravings & appetite Highest Adult Wt: now, Lowest Adult Wt:140, Goal:145 lb Food Recall: B- eggs, ham, eng muffin, L- salad or soup, D- steak, vegetables, baked potato Snacks- pretzels, nuts Water Intake: inadequate Exercise Regimen: Walks 3-4 miles x 6 days per week, in addition does strength training Sleep: adequate  Stress Level: low stress Gyn: Postmenopausal Social Hx: + alcohol intake (4 nights per week, drinks beer) Labs: done at [facility] weight loss center 6 months ago, will send for my review   9/14/22: Lost 3 lbs, has been focusing on increasing water intake and eating better overall. Walks 4-6 times per week. Reduced alcohol intake to 2 x per week. Takes phentermine 30 mg daily and feels that this helps with decreased cravings.   11/14/22: Maintained weight. Taking fish oil, which she is tolerating better. Water intake better. Walking outdoors for exercise. Has been sleeping better, taking phentermine earlier in the morning and drinks sleepy time tea when she is having trouble falling asleep. Wants to continue on phentermine until we hear back from insurance about saxenda. Has an apt with PCP in December.   1/5/22: Lost 1 lb, down 8 lbs total. Feels that phentermine continues to help with cravings and appetite. Saw PCP in December and had elevated cholesterol level, was started on a low dose statin and plans to have labs rechecked in Feb. Walking for exercise, but plans to start strength training 3 days per week soon. Would like to start on a GLP1 if insurance will approve.   2/8/23: Lost 2 lbs, down 10 lbs total. Started taking a statin for cholesterol. Walking more overall. Tracking dietary intake which is helping. Water intake- inadequate. Trying to incorporate healthy food options and decrease carb intake: enjoys on an egg over sweet potato for breakfast and increasing vegetable intake. Doing better with falling asleep  3/20/23:Maintained weight. Reports that she had Cholesterol rechecked and saw a significant reduction in cholesterol levels. Working on water intake. 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 Continues to tolerate phentermine 30 mg daily.   4/18/23: Tolerating phentermine and topiramate daily.  Sleeping better and feels more dry mouth. Has been walking- struggling with hip pain, but able to do 2-2.5 miles most days. Increasing water intake daily. Wants to focus on decreasing intake of diet coke daily.   5/15/23: Walking, increased water intake. Down an inch in waist. Fitting in jeans better.  6/21/23: Maintained weight, continues to walk for exercise and drink adequate water. Has been eating well overall. Plans on focusing on exercise once the summer begins since she will be off. Plans on going to the [employer] to walk and then hanging at the beach. Already plans on meal prepping for beach days to avoid going to the [employer] stand.   9/27/23: Lost 4 lbs. Ate well, very active all summer with caring for her 14 month old granddaughter. Doing strength training 2 x per week. Taking both phentermine and Topamax- some days feels more hunger. Continues to walk 10,000 steps per day. Motivated to lose a few more pounds for upcoming weddings.   11/20/23: Had recent buinion surgery 2 weeks ago. Walking was limited, getting about 8,000 steps per day. Being causeous with dietary intake. Tolerating increased dose of medication fine, was off for a week, but able to get back on track. BP ranging 120-124/80s. Water intake better overall.   2/14/24:  Sick for a week with a virus, felt tired after being sick. Started walking 3 days per week. Walks after work as well when able. Started a new ria called "better me" and doing 10 min strenght training exercises. Continues to take phentermine 37.5 mg weekly, topiramate 50 mg QHS- helped with sleep initially, but doesnt feel that it helps as much lately

## 2024-04-17 ENCOUNTER — RX RENEWAL (OUTPATIENT)
Age: 59
End: 2024-04-17

## 2024-05-13 ENCOUNTER — APPOINTMENT (OUTPATIENT)
Dept: BARIATRICS/WEIGHT MGMT | Facility: CLINIC | Age: 59
End: 2024-05-13
Payer: COMMERCIAL

## 2024-05-13 VITALS — BODY MASS INDEX: 27.81 KG/M2 | WEIGHT: 162 LBS

## 2024-05-13 DIAGNOSIS — Z86.39 PERSONAL HISTORY OF OTHER ENDOCRINE, NUTRITIONAL AND METABOLIC DISEASE: ICD-10-CM

## 2024-05-13 DIAGNOSIS — E78.00 PURE HYPERCHOLESTEROLEMIA, UNSPECIFIED: ICD-10-CM

## 2024-05-13 DIAGNOSIS — E66.3 OVERWEIGHT: ICD-10-CM

## 2024-05-13 PROCEDURE — 99213 OFFICE O/P EST LOW 20 MIN: CPT

## 2024-05-13 RX ORDER — TOPIRAMATE 25 MG/1
25 TABLET, FILM COATED ORAL
Qty: 90 | Refills: 0 | Status: ACTIVE | COMMUNITY
Start: 2023-03-20 | End: 1900-01-01

## 2024-05-13 RX ORDER — PHENTERMINE HYDROCHLORIDE 37.5 MG/1
37.5 CAPSULE ORAL
Qty: 30 | Refills: 1 | Status: ACTIVE | COMMUNITY
Start: 2023-09-27 | End: 1900-01-01

## 2024-05-14 NOTE — HISTORY OF PRESENT ILLNESS
[FreeTextEntry1] : 58 y/o F here for initial medical weight management consultation, self referred  Medical Hx: Obesity, HLD  Started struggling with weight after having a hysterectomy in 2010 for hx of fibroids and heavy bleeding.  Past Wt Loss Attempts: WW, low carb, Atkins, Intermittent fasting, Medi weight loss  Medications- tried phentermine in the past which was helpful with cravings & appetite Highest Adult Wt: now, Lowest Adult Wt:140, Goal:145 lb Food Recall: B- eggs, ham, eng muffin, L- salad or soup, D- steak, vegetables, baked potato Snacks- pretzels, nuts Water Intake: inadequate Exercise Regimen: Walks 3-4 miles x 6 days per week, in addition does strength training Sleep: adequate  Stress Level: low stress Gyn: Postmenopausal Social Hx: + alcohol intake (4 nights per week, drinks beer) Labs: done at [facility] weight loss center 6 months ago, will send for my review   9/14/22: Lost 3 lbs, has been focusing on increasing water intake and eating better overall. Walks 4-6 times per week. Reduced alcohol intake to 2 x per week. Takes phentermine 30 mg daily and feels that this helps with decreased cravings.   11/14/22: Maintained weight. Taking fish oil, which she is tolerating better. Water intake better. Walking outdoors for exercise. Has been sleeping better, taking phentermine earlier in the morning and drinks sleepy time tea when she is having trouble falling asleep. Wants to continue on phentermine until we hear back from insurance about saxenda. Has an apt with PCP in December.   1/5/22: Lost 1 lb, down 8 lbs total. Feels that phentermine continues to help with cravings and appetite. Saw PCP in December and had elevated cholesterol level, was started on a low dose statin and plans to have labs rechecked in Feb. Walking for exercise, but plans to start strength training 3 days per week soon. Would like to start on a GLP1 if insurance will approve.   2/8/23: Lost 2 lbs, down 10 lbs total. Started taking a statin for cholesterol. Walking more overall. Tracking dietary intake which is helping. Water intake- inadequate. Trying to incorporate healthy food options and decrease carb intake: enjoys on an egg over sweet potato for breakfast and increasing vegetable intake. Doing better with falling asleep  3/20/23:Maintained weight. Reports that she had Cholesterol rechecked and saw a significant reduction in cholesterol levels. Working on water intake. 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 Continues to tolerate phentermine 30 mg daily.   4/18/23: Tolerating phentermine and topiramate daily.  Sleeping better and feels more dry mouth. Has been walking- struggling with hip pain, but able to do 2-2.5 miles most days. Increasing water intake daily. Wants to focus on decreasing intake of diet coke daily.   5/15/23: Walking, increased water intake. Down an inch in waist. Fitting in jeans better.  6/21/23: Maintained weight, continues to walk for exercise and drink adequate water. Has been eating well overall. Plans on focusing on exercise once the summer begins since she will be off. Plans on going to the [employer] to walk and then hanging at the beach. Already plans on meal prepping for beach days to avoid going to the [employer] stand.   9/27/23: Lost 4 lbs. Ate well, very active all summer with caring for her 14 month old granddaughter. Doing strength training 2 x per week. Taking both phentermine and Topamax- some days feels more hunger. Continues to walk 10,000 steps per day. Motivated to lose a few more pounds for upcoming weddings.   11/20/23: Had recent buinion surgery 2 weeks ago. Walking was limited, getting about 8,000 steps per day. Being causeous with dietary intake. Tolerating increased dose of medication fine, was off for a week, but able to get back on track. BP ranging 120-124/80s. Water intake better overall.   2/14/24:  Sick for a week with a virus, felt tired after being sick. Started walking 3 days per week. Walks after work as well when able. Started a new ria called "better me" and doing 10 min strenght training exercises. Continues to take phentermine 37.5 mg weekly, topiramate 50 mg QHS- helped with sleep initially, but doesnt feel that it helps as much lately  5/13/24: Walking when able to incorporate it. Drinking more water lately. Eating a green shake to help increase fiber intake. Did the 5 K mother's day walk. Checks BP at home and WNL. Continues to tolerate phentermine and topiramate. Struggles with motivation for exercise. Labs available in [facility] from 12/5/23: CBC, CMP and TSH WNL, Cholesterol elevated but improved- , TChol 215

## 2024-05-14 NOTE — ASSESSMENT
[FreeTextEntry1] : Continue healthy lifestyle  Exercise goal- find an accountability partner to help with motivation for exercise Med- continue phentermine and topiramate daily  Labs- will review in [facility] RTO in 2 months for f.u

## 2024-09-11 ENCOUNTER — APPOINTMENT (OUTPATIENT)
Dept: BARIATRICS/WEIGHT MGMT | Facility: CLINIC | Age: 59
End: 2024-09-11
Payer: COMMERCIAL

## 2024-09-11 VITALS — WEIGHT: 165 LBS | BODY MASS INDEX: 28.32 KG/M2

## 2024-09-11 DIAGNOSIS — Z86.39 PERSONAL HISTORY OF OTHER ENDOCRINE, NUTRITIONAL AND METABOLIC DISEASE: ICD-10-CM

## 2024-09-11 DIAGNOSIS — E78.00 PURE HYPERCHOLESTEROLEMIA, UNSPECIFIED: ICD-10-CM

## 2024-09-11 PROCEDURE — 99212 OFFICE O/P EST SF 10 MIN: CPT

## 2024-09-11 NOTE — HISTORY OF PRESENT ILLNESS
[Other Location: e.g. School (Enter Location, City,State)___] : at [unfilled], at the time of the visit. [Other Location: e.g. Home (Enter Location, City,State)___] : at [unfilled] [Verbal consent obtained from patient] : the patient, [unfilled] [FreeTextEntry1] : 58 y/o F here for initial medical weight management consultation, self referred  Medical Hx: Obesity, HLD  Started struggling with weight after having a hysterectomy in 2010 for hx of fibroids and heavy bleeding.  Past Wt Loss Attempts: WW, low carb, Atkins, Intermittent fasting, Medi weight loss  Medications- tried phentermine in the past which was helpful with cravings & appetite Highest Adult Wt: now, Lowest Adult Wt:140, Goal:145 lb Food Recall: B- eggs, ham, eng muffin, L- salad or soup, D- steak, vegetables, baked potato Snacks- pretzels, nuts Water Intake: inadequate Exercise Regimen: Walks 3-4 miles x 6 days per week, in addition does strength training Sleep: adequate  Stress Level: low stress Gyn: Postmenopausal Social Hx: + alcohol intake (4 nights per week, drinks beer) Labs: done at [facility] 6 months ago, will send for my review   9/14/22: Lost 3 lbs, has been focusing on increasing water intake and eating better overall. Walks 4-6 times per week. Reduced alcohol intake to 2 x per week. Takes phentermine 30 mg daily and feels that this helps with decreased cravings.   11/14/22: Maintained weight. Taking fish oil, which she is tolerating better. Water intake better. Walking outdoors for exercise. Has been sleeping better, taking phentermine earlier in the morning and drinks sleepy time tea when she is having trouble falling asleep. Wants to continue on phentermine until we hear back from insurance about saxenda. Has an apt with PCP in December.   1/5/22: Lost 1 lb, down 8 lbs total. Feels that phentermine continues to help with cravings and appetite. Saw PCP in December and had elevated cholesterol level, was started on a low dose statin and plans to have labs rechecked in Feb. Walking for exercise, but plans to start strength training 3 days per week soon. Would like to start on a GLP1 if insurance will approve.   2/8/23: Lost 2 lbs, down 10 lbs total. Started taking a statin for cholesterol. Walking more overall. Tracking dietary intake which is helping. Water intake- inadequate. Trying to incorporate healthy food options and decrease carb intake: enjoys on an egg over sweet potato for breakfast and increasing vegetable intake. Doing better with falling asleep  3/20/23:Maintained weight. Reports that she had Cholesterol rechecked and saw a significant reduction in cholesterol levels. Working on water intake. 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 Continues to tolerate phentermine 30 mg daily.   4/18/23: Tolerating phentermine and topiramate daily.  Sleeping better and feels more dry mouth. Has been walking- struggling with hip pain, but able to do 2-2.5 miles most days. Increasing water intake daily. Wants to focus on decreasing intake of diet coke daily.   5/15/23: Walking, increased water intake. Down an inch in waist. Fitting in jeans better.  6/21/23: Maintained weight, continues to walk for exercise and drink adequate water. Has been eating well overall. Plans on focusing on exercise once the summer begins since she will be off. Plans on going to the [place] to walk and then hanging at the beach. Already plans on meal prepping for beach days to avoid going to the [employer]oo stand.   9/27/23: Lost 4 lbs. Ate well, very active all summer with caring for her 14 month old granddaughter. Doing strength training 2 x per week. Taking both phentermine and Topamax- some days feels more hunger. Continues to walk 10,000 steps per day. Motivated to lose a few more pounds for upcoming weddings.   11/20/23: Had recent buinion surgery 2 weeks ago. Walking was limited, getting about 8,000 steps per day. Being causeous with dietary intake. Tolerating increased dose of medication fine, was off for a week, but able to get back on track. BP ranging 120-124/80s. Water intake better overall.   2/14/24:  Sick for a week with a virus, felt tired after being sick. Started walking 3 days per week. Walks after work as well when able. Started a new ria called "better me" and doing 10 min strenght training exercises. Continues to take phentermine 37.5 mg weekly, topiramate 50 mg QHS- helped with sleep initially, but doesnt feel that it helps as much lately  5/13/24: Walking when able to incorporate it. Drinking more water lately. Eating a green shake to help increase fiber intake. Did the 5 K mother's day walk. Checks BP at home and WNL. Continues to tolerate phentermine and topiramate. Struggles with motivation for exercise. Labs available in [facility] from 12/5/23: CBC, CMP and TSH WNL, Cholesterol elevated but improved- , TChol 215  9/11/24: Spent a lot of time with grandchildren- walking 10,000 steps while hanging out with grandson. Has been off phentermine x 4 weeks and noticed an increase in appetite and gained 3 lbs. Wants to restart and discuss options for weight loss. Plans on walking with colleague after school 2 x per week for exercise and continues to make healthy dietary choices

## 2024-09-11 NOTE — ASSESSMENT
[FreeTextEntry1] : Restart phentermine 15 mg daily, can increase to 2 tabs if needed for appetite  Continue to increase activity level  Discussed self pay options for GLp1 treatment, plans on looking into insurance coverage for this first RTO in 1 month

## 2024-10-07 ENCOUNTER — APPOINTMENT (OUTPATIENT)
Dept: BARIATRICS/WEIGHT MGMT | Facility: CLINIC | Age: 59
End: 2024-10-07
Payer: COMMERCIAL

## 2024-10-07 VITALS — WEIGHT: 163 LBS | HEIGHT: 64 IN | BODY MASS INDEX: 27.83 KG/M2

## 2024-10-07 DIAGNOSIS — E66.3 OVERWEIGHT: ICD-10-CM

## 2024-10-07 DIAGNOSIS — E78.00 PURE HYPERCHOLESTEROLEMIA, UNSPECIFIED: ICD-10-CM

## 2024-10-07 DIAGNOSIS — Z86.39 PERSONAL HISTORY OF OTHER ENDOCRINE, NUTRITIONAL AND METABOLIC DISEASE: ICD-10-CM

## 2024-10-07 PROCEDURE — 99212 OFFICE O/P EST SF 10 MIN: CPT

## 2024-11-13 ENCOUNTER — APPOINTMENT (OUTPATIENT)
Dept: BARIATRICS/WEIGHT MGMT | Facility: CLINIC | Age: 59
End: 2024-11-13

## 2025-01-22 ENCOUNTER — APPOINTMENT (OUTPATIENT)
Dept: BARIATRICS/WEIGHT MGMT | Facility: CLINIC | Age: 60
End: 2025-01-22
Payer: COMMERCIAL

## 2025-01-22 VITALS — WEIGHT: 168 LBS | BODY MASS INDEX: 28.68 KG/M2 | HEIGHT: 64 IN

## 2025-01-22 DIAGNOSIS — E66.3 OVERWEIGHT: ICD-10-CM

## 2025-01-22 DIAGNOSIS — E78.00 PURE HYPERCHOLESTEROLEMIA, UNSPECIFIED: ICD-10-CM

## 2025-01-22 PROCEDURE — 99214 OFFICE O/P EST MOD 30 MIN: CPT | Mod: 95

## 2025-02-19 ENCOUNTER — APPOINTMENT (OUTPATIENT)
Dept: BARIATRICS/WEIGHT MGMT | Facility: CLINIC | Age: 60
End: 2025-02-19
Payer: COMMERCIAL

## 2025-02-19 VITALS — BODY MASS INDEX: 28.49 KG/M2 | WEIGHT: 166 LBS

## 2025-02-19 DIAGNOSIS — Z86.39 PERSONAL HISTORY OF OTHER ENDOCRINE, NUTRITIONAL AND METABOLIC DISEASE: ICD-10-CM

## 2025-02-19 DIAGNOSIS — E78.00 PURE HYPERCHOLESTEROLEMIA, UNSPECIFIED: ICD-10-CM

## 2025-02-19 PROCEDURE — 99213 OFFICE O/P EST LOW 20 MIN: CPT | Mod: 95

## 2025-02-19 RX ORDER — TOPIRAMATE 25 MG/1
25 CAPSULE, EXTENDED RELEASE ORAL
Qty: 90 | Refills: 1 | Status: ACTIVE | COMMUNITY
Start: 2025-02-19 | End: 1900-01-01

## 2025-03-24 ENCOUNTER — APPOINTMENT (OUTPATIENT)
Dept: BARIATRICS/WEIGHT MGMT | Facility: CLINIC | Age: 60
End: 2025-03-24

## 2025-04-09 ENCOUNTER — APPOINTMENT (OUTPATIENT)
Dept: BARIATRICS/WEIGHT MGMT | Facility: CLINIC | Age: 60
End: 2025-04-09
Payer: COMMERCIAL

## 2025-04-09 VITALS — WEIGHT: 165 LBS | BODY MASS INDEX: 28.32 KG/M2

## 2025-04-09 DIAGNOSIS — E66.9 OBESITY, UNSPECIFIED: ICD-10-CM

## 2025-04-09 DIAGNOSIS — E78.00 PURE HYPERCHOLESTEROLEMIA, UNSPECIFIED: ICD-10-CM

## 2025-04-09 PROCEDURE — 99213 OFFICE O/P EST LOW 20 MIN: CPT | Mod: 95

## 2025-04-09 RX ORDER — TIRZEPATIDE 2.5 MG/.5ML
2.5 INJECTION, SOLUTION SUBCUTANEOUS
Qty: 1 | Refills: 1 | Status: ACTIVE | COMMUNITY
Start: 2025-04-09 | End: 1900-01-01

## 2025-04-30 ENCOUNTER — APPOINTMENT (OUTPATIENT)
Dept: DERMATOLOGY | Facility: CLINIC | Age: 60
End: 2025-04-30

## 2025-05-07 ENCOUNTER — APPOINTMENT (OUTPATIENT)
Dept: BARIATRICS/WEIGHT MGMT | Facility: CLINIC | Age: 60
End: 2025-05-07
Payer: COMMERCIAL

## 2025-05-07 VITALS — WEIGHT: 158 LBS | BODY MASS INDEX: 27.12 KG/M2

## 2025-05-07 DIAGNOSIS — E78.00 PURE HYPERCHOLESTEROLEMIA, UNSPECIFIED: ICD-10-CM

## 2025-05-07 DIAGNOSIS — E66.9 OBESITY, UNSPECIFIED: ICD-10-CM

## 2025-05-07 PROCEDURE — 99212 OFFICE O/P EST SF 10 MIN: CPT | Mod: 95

## 2025-07-09 ENCOUNTER — APPOINTMENT (OUTPATIENT)
Dept: BARIATRICS/WEIGHT MGMT | Facility: CLINIC | Age: 60
End: 2025-07-09
Payer: COMMERCIAL

## 2025-07-09 VITALS — HEIGHT: 64 IN | BODY MASS INDEX: 24.41 KG/M2 | WEIGHT: 143 LBS

## 2025-07-09 PROCEDURE — 99212 OFFICE O/P EST SF 10 MIN: CPT | Mod: 95

## 2025-07-28 ENCOUNTER — RX RENEWAL (OUTPATIENT)
Age: 60
End: 2025-07-28